# Patient Record
Sex: MALE | Race: WHITE | NOT HISPANIC OR LATINO | Employment: FULL TIME | URBAN - METROPOLITAN AREA
[De-identification: names, ages, dates, MRNs, and addresses within clinical notes are randomized per-mention and may not be internally consistent; named-entity substitution may affect disease eponyms.]

---

## 2017-03-31 RX ORDER — EXTENDED PHENYTOIN SODIUM 30 MG/1
CAPSULE ORAL
Qty: 30 CAPSULE | Refills: 0 | Status: SHIPPED | OUTPATIENT
Start: 2017-03-31 | End: 2017-05-05 | Stop reason: SDUPTHER

## 2017-03-31 RX ORDER — PHENYTOIN SODIUM 100 MG/1
CAPSULE, EXTENDED RELEASE ORAL
Qty: 150 CAPSULE | Refills: 0 | Status: SHIPPED | OUTPATIENT
Start: 2017-03-31 | End: 2017-05-05 | Stop reason: SDUPTHER

## 2017-05-04 RX ORDER — EXTENDED PHENYTOIN SODIUM 30 MG/1
CAPSULE ORAL
Qty: 30 CAPSULE | Refills: 0 | OUTPATIENT
Start: 2017-05-04

## 2017-05-04 RX ORDER — PHENYTOIN SODIUM 100 MG/1
CAPSULE, EXTENDED RELEASE ORAL
Qty: 150 CAPSULE | Refills: 0 | OUTPATIENT
Start: 2017-05-04

## 2017-05-05 RX ORDER — EXTENDED PHENYTOIN SODIUM 30 MG/1
30 CAPSULE ORAL DAILY
Qty: 30 CAPSULE | Refills: 0 | Status: SHIPPED | OUTPATIENT
Start: 2017-05-05 | End: 2017-05-15 | Stop reason: SDUPTHER

## 2017-05-05 RX ORDER — PHENYTOIN SODIUM 100 MG/1
500 CAPSULE, EXTENDED RELEASE ORAL DAILY
Qty: 150 CAPSULE | Refills: 0 | Status: SHIPPED | OUTPATIENT
Start: 2017-05-05 | End: 2017-05-15 | Stop reason: SDUPTHER

## 2017-05-15 ENCOUNTER — OFFICE VISIT (OUTPATIENT)
Dept: NEUROLOGY | Facility: CLINIC | Age: 57
End: 2017-05-15

## 2017-05-15 VITALS
WEIGHT: 232 LBS | DIASTOLIC BLOOD PRESSURE: 75 MMHG | BODY MASS INDEX: 29.77 KG/M2 | HEIGHT: 74 IN | SYSTOLIC BLOOD PRESSURE: 135 MMHG

## 2017-05-15 DIAGNOSIS — R56.9 CONVULSIONS, UNSPECIFIED CONVULSION TYPE (HCC): Primary | ICD-10-CM

## 2017-05-15 PROCEDURE — 99212 OFFICE O/P EST SF 10 MIN: CPT | Performed by: PSYCHIATRY & NEUROLOGY

## 2017-05-15 RX ORDER — EXTENDED PHENYTOIN SODIUM 30 MG/1
30 CAPSULE ORAL DAILY
Qty: 30 CAPSULE | Refills: 0 | Status: SHIPPED | OUTPATIENT
Start: 2017-05-15 | End: 2018-05-14 | Stop reason: SDUPTHER

## 2017-05-15 RX ORDER — PHENYTOIN SODIUM 100 MG/1
500 CAPSULE, EXTENDED RELEASE ORAL DAILY
Qty: 150 CAPSULE | Refills: 11 | Status: SHIPPED | OUTPATIENT
Start: 2017-05-15 | End: 2018-05-14 | Stop reason: SDUPTHER

## 2018-05-14 ENCOUNTER — OFFICE VISIT (OUTPATIENT)
Dept: NEUROLOGY | Facility: CLINIC | Age: 58
End: 2018-05-14

## 2018-05-14 VITALS
DIASTOLIC BLOOD PRESSURE: 78 MMHG | HEIGHT: 74 IN | SYSTOLIC BLOOD PRESSURE: 120 MMHG | BODY MASS INDEX: 28.88 KG/M2 | WEIGHT: 225 LBS

## 2018-05-14 DIAGNOSIS — R56.9 CONVULSIONS, UNSPECIFIED CONVULSION TYPE (HCC): Primary | ICD-10-CM

## 2018-05-14 PROCEDURE — 99212 OFFICE O/P EST SF 10 MIN: CPT | Performed by: PSYCHIATRY & NEUROLOGY

## 2018-05-14 RX ORDER — EXTENDED PHENYTOIN SODIUM 30 MG/1
30 CAPSULE ORAL DAILY
Qty: 30 CAPSULE | Refills: 11 | Status: SHIPPED | OUTPATIENT
Start: 2018-05-14 | End: 2018-09-13 | Stop reason: SDUPTHER

## 2018-05-14 RX ORDER — PHENYTOIN SODIUM 100 MG/1
500 CAPSULE, EXTENDED RELEASE ORAL DAILY
Qty: 150 CAPSULE | Refills: 11 | Status: SHIPPED | OUTPATIENT
Start: 2018-05-14 | End: 2019-05-14 | Stop reason: SDUPTHER

## 2018-05-14 NOTE — PROGRESS NOTES
Subjective:     Patient ID: Saran Rivera is a 58 y.o. male.    History of Present Illness     No seizures over the past year.  Compliant.  No side effects.  No new problems.  The following portions of the patient's history were reviewed and updated as appropriate: allergies, current medications, past family history, past medical history, past social history, past surgical history and problem list.      Current Outpatient Prescriptions:   •  allopurinol (ZYLOPRIM) 300 MG tablet, Take  by mouth daily., Disp: , Rfl:   •  colchicine 0.6 MG tablet, Take  by mouth daily., Disp: , Rfl:   •  DILANTIN 100 MG ER capsule, Take 5 capsules by mouth Daily., Disp: 150 capsule, Rfl: 11  •  DILANTIN 30 MG ER capsule, Take 1 capsule by mouth Daily., Disp: 30 capsule, Rfl: 11  •  fenofibrate (TRICOR) 145 MG tablet, Take  by mouth daily., Disp: , Rfl:     Review of Systems   Constitutional: Negative.    Neurological: Negative.    Psychiatric/Behavioral: Negative.         Objective:    Neurologic Exam  Mental status examination was appropriate.  Funduscopy, visual fields, eye movements and pupillary reflexes were normal.  No facial weakness was noted.  Gait was normal.  No pattern of focal weakness was noted.  Physical Exam    Assessment/Plan:     Saran was seen today for seizures.    Diagnoses and all orders for this visit:    Convulsions, unspecified convulsion type    Other orders  -     DILANTIN 100 MG ER capsule; Take 5 capsules by mouth Daily.  -     DILANTIN 30 MG ER capsule; Take 1 capsule by mouth Daily.         Prescription drug management - meds as above, brand name    Follow-up in the office in one year. Thank you for allowing me to share in the care of this patient.  Johan Guerra M.D.

## 2018-09-13 RX ORDER — EXTENDED PHENYTOIN SODIUM 30 MG/1
CAPSULE ORAL
Qty: 90 CAPSULE | Refills: 4 | Status: SHIPPED | OUTPATIENT
Start: 2018-09-13 | End: 2019-05-14 | Stop reason: SDUPTHER

## 2019-05-14 ENCOUNTER — OFFICE VISIT (OUTPATIENT)
Dept: NEUROLOGY | Facility: CLINIC | Age: 59
End: 2019-05-14

## 2019-05-14 VITALS
SYSTOLIC BLOOD PRESSURE: 145 MMHG | BODY MASS INDEX: 29.26 KG/M2 | DIASTOLIC BLOOD PRESSURE: 71 MMHG | WEIGHT: 228 LBS | HEIGHT: 74 IN

## 2019-05-14 DIAGNOSIS — R56.9 CONVULSIONS, UNSPECIFIED CONVULSION TYPE (HCC): Primary | ICD-10-CM

## 2019-05-14 PROCEDURE — 99212 OFFICE O/P EST SF 10 MIN: CPT | Performed by: PSYCHIATRY & NEUROLOGY

## 2019-05-14 RX ORDER — PHENYTOIN SODIUM 100 MG/1
500 CAPSULE, EXTENDED RELEASE ORAL DAILY
Qty: 150 CAPSULE | Refills: 11 | Status: SHIPPED | OUTPATIENT
Start: 2019-05-14 | End: 2020-04-20

## 2019-05-14 RX ORDER — EXTENDED PHENYTOIN SODIUM 30 MG/1
30 CAPSULE ORAL DAILY
Qty: 90 CAPSULE | Refills: 3 | Status: SHIPPED | OUTPATIENT
Start: 2019-05-14 | End: 2020-04-20

## 2019-05-14 NOTE — PROGRESS NOTES
Subjective:     Patient ID: Saran Rivera is a 59 y.o. male.    History of Present Illness  No seizures over the past year.   No side effects.  Compliant.  On Dilantin brand-name short 30 mg a morning and 500 mg at night.     The following portions of the patient's history were reviewed and updated as appropriate: allergies, current medications, past family history, past medical history, past social history, past surgical history and problem list.      Current Outpatient Medications:   •  allopurinol (ZYLOPRIM) 300 MG tablet, Take  by mouth daily., Disp: , Rfl:   •  colchicine 0.6 MG tablet, Take  by mouth daily., Disp: , Rfl:   •  DILANTIN 100 MG ER capsule, Take 5 capsules by mouth Daily., Disp: 150 capsule, Rfl: 11  •  DILANTIN 30 MG ER capsule, Take 1 capsule by mouth Daily., Disp: 90 capsule, Rfl: 3  •  fenofibrate (TRICOR) 145 MG tablet, Take  by mouth daily., Disp: , Rfl:     Review of Systems   Constitutional: Negative.    Neurological: Negative.    Psychiatric/Behavioral: Negative.           I have reviewed ROS completed by medical assistant.     Objective:    Neurologic Exam  Mental status examination was appropriate.  Funduscopy, visual fields, eye movements and pupillary reflexes were normal.  No facial weakness was noted.  Gait was normal.  No pattern of focal weakness was noted.  Physical Exam    Assessment/Plan:     Saran was seen today for seizures.    Diagnoses and all orders for this visit:    Convulsions, unspecified convulsion type (CMS/HCC)    Other orders  -     DILANTIN 30 MG ER capsule; Take 1 capsule by mouth Daily.  -     DILANTIN 100 MG ER capsule; Take 5 capsules by mouth Daily.         Prescription drug management - meds as above    Follow-up in the office in one year. Thank you for allowing me to share in the care of this patient.  Johan Guerra M.D.

## 2020-01-08 ENCOUNTER — TELEPHONE (OUTPATIENT)
Dept: NEUROLOGY | Facility: CLINIC | Age: 60
End: 2020-01-08

## 2020-01-08 NOTE — TELEPHONE ENCOUNTER
Pt stated his insurance changed to [employer] and they are not wanting to pay for brand name dilantin. He stated that Dr. Waters had wrote a letter for him before when he had this issue. He wanted to know if he could get another letter to send to [facility] to explain to them why he needed to be on brand name dilantin. He stated he has enough to last him for a little bit but wanted to go ahead and get the letter started and sent so he doesn't have any trouble refilling his medicine.

## 2020-04-20 RX ORDER — PHENYTOIN SODIUM 100 MG/1
500 CAPSULE, EXTENDED RELEASE ORAL DAILY
Qty: 450 CAPSULE | Refills: 0 | Status: SHIPPED | OUTPATIENT
Start: 2020-04-20 | End: 2020-05-07 | Stop reason: SDUPTHER

## 2020-04-20 RX ORDER — EXTENDED PHENYTOIN SODIUM 30 MG/1
CAPSULE ORAL
Qty: 90 CAPSULE | Refills: 0 | Status: SHIPPED | OUTPATIENT
Start: 2020-04-20 | End: 2020-05-07 | Stop reason: SDUPTHER

## 2020-05-07 ENCOUNTER — TELEMEDICINE (OUTPATIENT)
Dept: NEUROLOGY | Facility: CLINIC | Age: 60
End: 2020-05-07

## 2020-05-07 DIAGNOSIS — R56.9 CONVULSIONS, UNSPECIFIED CONVULSION TYPE (HCC): Primary | ICD-10-CM

## 2020-05-07 PROCEDURE — 99442 PR PHYS/QHP TELEPHONE EVALUATION 11-20 MIN: CPT | Performed by: PSYCHIATRY & NEUROLOGY

## 2020-05-07 RX ORDER — PHENYTOIN SODIUM 100 MG/1
500 CAPSULE, EXTENDED RELEASE ORAL DAILY
Qty: 450 CAPSULE | Refills: 3 | Status: SHIPPED | OUTPATIENT
Start: 2020-05-07 | End: 2021-05-06 | Stop reason: SDUPTHER

## 2020-05-07 RX ORDER — EXTENDED PHENYTOIN SODIUM 30 MG/1
30 CAPSULE ORAL DAILY
Qty: 90 CAPSULE | Refills: 3 | Status: SHIPPED | OUTPATIENT
Start: 2020-05-07 | End: 2021-05-06 | Stop reason: SDUPTHER

## 2020-05-07 NOTE — PROGRESS NOTES
I performed this clinical encounter by utilizing a real-time telehealth video connection between my location and the patient's location at home.  I obtained verbal consent from the patient to perform this clinical encounter utilizing video and prepared the patient by answering any questions they had about the telehealth interaction.    Subjective:       Patient ID: Saran Rivera is a 60 y.o. male presents for   Chief Complaint   Patient presents with   • Seizures          History of Present Illness  Phone visit.  Patient was seen back in the office for follow-up of seizures.  No seizures since his last visit a year ago.  He is on brand-name Dilantin 530 mg at night.  He has had breakthrough seizures on generic in the past.  No side effects.  Wishes to make no change.    The following portions of the patient's history were reviewed and updated as appropriate: allergies, current medications, past family history, past medical history, past social history, past surgical history and problem list.      Current Outpatient Medications on File Prior to Visit   Medication Sig Dispense Refill   • allopurinol (ZYLOPRIM) 300 MG tablet Take  by mouth daily.     • colchicine 0.6 MG tablet Take  by mouth daily.     • fenofibrate (TRICOR) 145 MG tablet Take  by mouth daily.     • [DISCONTINUED] DILANTIN 100 MG ER capsule TAKE 5 CAPSULES BY MOUTH DAILY 450 capsule 0   • [DISCONTINUED] DILANTIN 30 MG ER capsule TAKE 1 CAPSULE BY MOUTH EVERY DAY 90 capsule 0     No current facility-administered medications on file prior to visit.            Review of Systems       I have reviewed ROS completed by medical assistant.     Objective:    Neurologic Exam    Physical Exam    Assessment/Plan:  Saran was seen today for seizures.    Diagnoses and all orders for this visit:    Convulsions, unspecified convulsion type (CMS/HCC)    Other orders  -     DILANTIN 30 MG ER capsule; Take 1 capsule by mouth Daily.  -     DILANTIN 100 MG ER  capsule; Take 5 capsules by mouth Daily.      Prescription drug management - meds as above    Follow-up in the office in one year. Thank you for allowing me to share in the care of this patient.  Johan Guerra M.D.     Unable to complete visit using a video connection to the patient. A phone visit was used to complete this visits. Total time of discussion was 15 minutes.

## 2021-05-06 ENCOUNTER — OFFICE VISIT (OUTPATIENT)
Dept: NEUROLOGY | Facility: CLINIC | Age: 61
End: 2021-05-06

## 2021-05-06 VITALS
HEIGHT: 74 IN | OXYGEN SATURATION: 96 % | DIASTOLIC BLOOD PRESSURE: 76 MMHG | SYSTOLIC BLOOD PRESSURE: 124 MMHG | HEART RATE: 74 BPM | WEIGHT: 235.6 LBS | BODY MASS INDEX: 30.24 KG/M2

## 2021-05-06 DIAGNOSIS — G40.909 SEIZURE DISORDER (HCC): Primary | ICD-10-CM

## 2021-05-06 PROCEDURE — 99213 OFFICE O/P EST LOW 20 MIN: CPT | Performed by: NURSE PRACTITIONER

## 2021-05-06 RX ORDER — ROSUVASTATIN CALCIUM 10 MG/1
10 TABLET, COATED ORAL
COMMUNITY
Start: 2021-02-13

## 2021-05-06 RX ORDER — PHENYTOIN SODIUM 100 MG/1
500 CAPSULE, EXTENDED RELEASE ORAL DAILY
Qty: 450 CAPSULE | Refills: 3 | Status: SHIPPED | OUTPATIENT
Start: 2021-05-06 | End: 2022-04-21

## 2021-05-06 RX ORDER — EXTENDED PHENYTOIN SODIUM 30 MG/1
30 CAPSULE ORAL DAILY
Qty: 90 CAPSULE | Refills: 3 | Status: SHIPPED | OUTPATIENT
Start: 2021-05-06 | End: 2022-05-05 | Stop reason: SDUPTHER

## 2021-05-06 NOTE — PROGRESS NOTES
Subjective:     Patient ID: Saran Rivera is a 61 y.o. male presenting for follow-up for seizure disorder.  He is a previous patient of Dr. Guerra.  He was last seen 1 year ago.  He states that he began having seizures in the early 90s.  He was in his 40s.  He states he had several seizures within a span of a few months and was started on Dilantin.  After several months they achieve seizure control.  His dose is 300 mg in the morning and 230 mg at nighttime.  He describes his seizures as generalized seizures.  He says he has a family history of seizure in his brother, however this was a febrile seizure as a child.  No other family history of seizures.  He denies a personal history of febrile seizures.  He denies a history of head injury.  He says he had several EEGs and MRIs around the onset of the seizures and he states he was told his EEG was abnormal but no known cause for the seizures was found.  He denies any problems since his last visit.          History of Present Illness  The following portions of the patient's history were reviewed and updated as appropriate: allergies, current medications, past family history, past medical history, past social history, past surgical history and problem list.    Review of Systems   Constitutional: Negative for chills, fatigue and fever.   HENT: Negative for hearing loss, tinnitus and trouble swallowing.    Eyes: Negative for pain, redness and itching.   Respiratory: Negative for cough, shortness of breath and wheezing.    Cardiovascular: Negative for chest pain, palpitations and leg swelling.   Gastrointestinal: Negative for diarrhea, nausea and vomiting.   Endocrine: Negative for cold intolerance, heat intolerance and polydipsia.   Genitourinary: Negative for decreased urine volume, difficulty urinating and urgency.   Musculoskeletal: Negative for back pain, neck pain and neck stiffness.   Skin: Negative for color change, rash and wound.   Allergic/Immunologic:  Negative for environmental allergies, food allergies and immunocompromised state.   Neurological: Negative for dizziness, tremors, seizures, syncope, facial asymmetry, speech difficulty, weakness, light-headedness, numbness and headaches.   Hematological: Negative for adenopathy. Does not bruise/bleed easily.   Psychiatric/Behavioral: Negative for confusion and sleep disturbance. The patient is not nervous/anxious.         Objective:    Neurologic Exam  General: Well nourished, well developed, and in no acute distress.  HEENT: Normocephalic/atraumatic. Mucous membranes moist. Sclerae anicteric.   Heart: Regular rate and rhythm. No murmurs, rubs or gallops.  Lungs: Clear to auscultation bilaterally.  Skin: No notable rashes or lesions on the visible surfaces.   Extremities: No clubbing, cyanosis or significant edema.   Psychiatric: Pleasant, cooperative, and appropriate.   Neurologic Exam:  Mental Status:  Alert and oriented. Speech is fluent. Comprehension is intact.   Cranial Nerves II-XII: Pupils equal, round, reactive to light. Extraocular movements are full and conjugate in all directions. Pursuit movements do not provoke any apparent dizziness or discomfort.  No nystagmus noted.  Hearing is intact to voice. Facial strength and sensation are preserved and symmetric. Tongue and palate midline. Voice non-hoarse, non-dysarthric.   Motor: Normal bulk and tone of bilateral upper and lower extremities. Strength is 5/5 in all 4 extremities both proximally and distally. There are no abnormal or involuntary movements noted.  Sensation: Intact to light touch throughout. Romberg was negative with no significant sway.   Coordination: Fully intact. Finger-to-nose performed accurately bilaterally.  Reflexes: The deep tendon reflexes are 2+/4 in bilateral biceps, brachioradialis, triceps, patella, and Achilles.  No pathologic reflexes were noted.  Gait: Normal. No ataxia or apraxia.         Physical  Exam    Assessment/Plan:     Diagnoses and all orders for this visit:    1. Seizure disorder (CMS/HCC) (Primary)    Other orders  -     Dilantin 30 MG ER capsule; Take 1 capsule by mouth Daily. NAME BRAND  Dispense: 90 capsule; Refill: 3  -     Dilantin 100 MG ER capsule; Take 5 capsules by mouth Daily. NAME BRAND  Dispense: 450 capsule; Refill: 3          He is doing well.  No changes made today.  He will continue Dilantin, name brand only, 300 mg in the morning and 230 mg in the evening.  Seizure precautions were discussed.  He is to call with any problems, otherwise he will follow-up annually.

## 2022-04-21 RX ORDER — PHENYTOIN SODIUM 100 MG/1
500 CAPSULE, EXTENDED RELEASE ORAL DAILY
Qty: 450 CAPSULE | Refills: 3 | Status: SHIPPED | OUTPATIENT
Start: 2022-04-21 | End: 2022-05-05 | Stop reason: SDUPTHER

## 2022-05-05 ENCOUNTER — OFFICE VISIT (OUTPATIENT)
Dept: NEUROLOGY | Facility: CLINIC | Age: 62
End: 2022-05-05

## 2022-05-05 VITALS
HEART RATE: 91 BPM | WEIGHT: 235 LBS | HEIGHT: 74 IN | BODY MASS INDEX: 30.16 KG/M2 | SYSTOLIC BLOOD PRESSURE: 138 MMHG | OXYGEN SATURATION: 96 % | DIASTOLIC BLOOD PRESSURE: 80 MMHG

## 2022-05-05 DIAGNOSIS — R56.9 CONVULSIONS, UNSPECIFIED CONVULSION TYPE: Primary | ICD-10-CM

## 2022-05-05 PROCEDURE — 99213 OFFICE O/P EST LOW 20 MIN: CPT | Performed by: NURSE PRACTITIONER

## 2022-05-05 RX ORDER — PHENYTOIN SODIUM 100 MG/1
500 CAPSULE, EXTENDED RELEASE ORAL DAILY
Qty: 450 CAPSULE | Refills: 3 | Status: SHIPPED | OUTPATIENT
Start: 2022-05-05 | End: 2023-02-13 | Stop reason: SDUPTHER

## 2022-05-05 RX ORDER — EXTENDED PHENYTOIN SODIUM 30 MG/1
30 CAPSULE ORAL DAILY
Qty: 90 CAPSULE | Refills: 3 | Status: SHIPPED | OUTPATIENT
Start: 2022-05-05

## 2022-05-05 NOTE — PROGRESS NOTES
Subjective:     Patient ID: Saran Rivera is a 62 y.o. male presenting for follow-up for seizure disorder.  He is a previous patient of Dr. Guerra.  He was last seen 1 year ago.  He states that he began having seizures in the early 90s.  He was in his 40s.  He states he had several seizures within a span of a few months and was started on Dilantin.  After several months they achieve seizure control.  His dose is 300 mg in the morning and 230 mg at nighttime.  He describes his seizures as generalized seizures.  He says he has a family history of seizure in his brother, however this was a febrile seizure as a child.  No other family history of seizures.  He denies a personal history of febrile seizures.  He denies a history of head injury.  He says he had several EEGs and MRIs around the onset of the seizures and he states he was told his EEG was abnormal but no known cause for the seizures was found.  He denies any problems since his last visit.      His last seizure was about 27 years ago. He says his youngest daughter was 4 at the time and was having a birthday party. He was in his basement working on his computer and was found on the floor by his wife. No seizures since that time.           History of Present Illness  The following portions of the patient's history were reviewed and updated as appropriate: allergies, current medications, past family history, past medical history, past social history, past surgical history and problem list.    Review of Systems   Musculoskeletal: Negative for gait problem.   Neurological: Negative for dizziness, tremors, seizures, syncope, facial asymmetry, speech difficulty, weakness, light-headedness, numbness and headaches.   Psychiatric/Behavioral: Negative for agitation, behavioral problems, confusion, decreased concentration, dysphoric mood, hallucinations, self-injury, sleep disturbance and suicidal ideas. The patient is not nervous/anxious and is not hyperactive.          Objective:    Neurologic Exam  General: Well nourished, well developed, and in no acute distress.  HEENT: Normocephalic/atraumatic. Mucous membranes moist. Sclerae anicteric.   Heart: Regular rate and rhythm. No murmurs, rubs or gallops.  Lungs: Clear to auscultation bilaterally.  Skin: No notable rashes or lesions on the visible surfaces.   Extremities: No clubbing, cyanosis or significant edema.   Psychiatric: Pleasant, cooperative, and appropriate.   Neurologic Exam:  Mental Status:  Alert and oriented. Speech is fluent. Comprehension is intact.   Cranial Nerves II-XII: Pupils equal, round, reactive to light. Extraocular movements are full and conjugate in all directions. Pursuit movements do not provoke any apparent dizziness or discomfort.  No nystagmus noted.  Hearing is intact to voice. Facial strength and sensation are preserved and symmetric. Tongue and palate midline. Voice non-hoarse, non-dysarthric.   Motor: Normal bulk and tone of bilateral upper and lower extremities. Strength is 5/5 in all 4 extremities both proximally and distally. There are no abnormal or involuntary movements noted.  Sensation: Intact to light touch throughout. Romberg was negative with no significant sway.   Coordination: Fully intact. Finger-to-nose performed accurately bilaterally.  Reflexes: The deep tendon reflexes are 2+/4 in bilateral biceps, brachioradialis, triceps, patella, and Achilles.  No pathologic reflexes were noted.  Gait: Normal. No ataxia or apraxia.     Physical Exam    Assessment/Plan:     Diagnoses and all orders for this visit:    1. Convulsions, unspecified convulsion type (HCC) (Primary)    Other orders  -     Dilantin 30 MG ER capsule; Take 1 capsule by mouth Daily. NAME BRAND  Dispense: 90 capsule; Refill: 3  -     Dilantin 100 MG capsule; Take 5 capsules by mouth Daily. NAME BRAND  Dispense: 450 capsule; Refill: 3          He is doing well.  No changes made today.  He will continue Dilantin, name  brand only, 300 mg in the morning and 230 mg in the evening.  Seizure precautions were discussed.  He is to call with any problems, otherwise he will follow-up annually.

## 2023-02-13 RX ORDER — PHENYTOIN SODIUM 100 MG/1
500 CAPSULE, EXTENDED RELEASE ORAL DAILY
Qty: 450 CAPSULE | Refills: 3 | Status: SHIPPED | OUTPATIENT
Start: 2023-02-13

## 2023-02-13 NOTE — TELEPHONE ENCOUNTER
Caller: GERDA WONG     Relationship: SELF    Best call back number: [phone]    Requested Prescriptions:   Requested Prescriptions     Pending Prescriptions Disp Refills   • Dilantin 100 MG capsule 450 capsule 3     Sig: Take 5 capsules by mouth Daily. NAME BRAND        Pharmacy where request should be sent: [facility]/PHARMACY #[id number] [place], [place] - [address] AT [address] [facility] - [phone] [facility] [phone] FX     Additional details provided by patient: PATIENT SAID HIS INSURANCE NEEDS MORE INFORMATION ABOUT THE RX SO THEY WILL APPROVE AND COVER THE COSTS. PATIENT SAID HE HAS ABOUT A WEEKS WORTH OF THE DILANTIN 100MG CAPSULES LEFT.     Does the patient have less than a 3 day supply:  [] Yes  [x] No    Would you like a call back once the refill request has been completed: [] Yes [x] No    If the office needs to give you a call back, can they leave a voicemail: [] Yes [x] No    Kenia Wilkes Rep   02/13/23 14:28 EST

## 2023-05-17 RX ORDER — EXTENDED PHENYTOIN SODIUM 30 MG/1
30 CAPSULE ORAL DAILY
Qty: 90 CAPSULE | Refills: 3 | Status: SHIPPED | OUTPATIENT
Start: 2023-05-17

## 2023-06-01 ENCOUNTER — OFFICE VISIT (OUTPATIENT)
Dept: NEUROLOGY | Facility: CLINIC | Age: 63
End: 2023-06-01

## 2023-06-01 VITALS
DIASTOLIC BLOOD PRESSURE: 72 MMHG | HEART RATE: 87 BPM | HEIGHT: 74 IN | BODY MASS INDEX: 30.17 KG/M2 | SYSTOLIC BLOOD PRESSURE: 138 MMHG | OXYGEN SATURATION: 97 %

## 2023-06-01 DIAGNOSIS — Z87.898 HISTORY OF SEIZURE: Primary | ICD-10-CM

## 2023-06-01 RX ORDER — PHENYTOIN SODIUM 100 MG/1
500 CAPSULE, EXTENDED RELEASE ORAL DAILY
Qty: 450 CAPSULE | Refills: 3 | Status: SHIPPED | OUTPATIENT
Start: 2023-06-01

## 2023-06-01 RX ORDER — EXTENDED PHENYTOIN SODIUM 30 MG/1
30 CAPSULE ORAL DAILY
Qty: 90 CAPSULE | Refills: 3 | Status: SHIPPED | OUTPATIENT
Start: 2023-06-01

## 2023-06-01 NOTE — LETTER
"June 1, 2023       No Recipients    Patient: Saran Rivera   YOB: 1960   Date of Visit: 6/1/2023       Dear Dr. Rockwell Recipients:    Thank you for referring Saran Rivera to me for evaluation. Below are the relevant portions of my assessment and plan of care.    If you have questions, please do not hesitate to call me. I look forward to following Saran along with you.         Sincerely,        RENATE Marinelli        CC:   No Recipients    Mohan Steele APRN  06/01/23 1619  Signed  Subjective    Saran Rivera is a 63 y.o. male presenting for follow-up.  He has a history of generalized convulsive seizures.  He is a previous patient of Dr. Guerra and has been taking Dilantin for many years.  He states that he began having seizures in the early 90s, he was in his 40s.  He had several seizures within a span of a few months and was started on Dilantin.  His current dose is 300 mg in the morning and 230 mg at nighttime.  He has not been on any other medications for his seizures.  He does note a family history of seizure in his brother, however he states this was a febrile seizure as a child.  No cause for his seizures was identified.  There are no previous EEGs in his chart, however he states that he did have an EEG many years ago and was told that it was \"abnormal\".  He states he had an MRI as well of his brain, however this is not in his chart either.  He states this was normal.    At his last visit he stated that his last seizure was almost 30 years ago, when his youngest daughter was about 4 years old.  Today he corrected this and stated that she was 14 years old.  He was in his basement working on his computer and was found in the floor by his wife.  He has not had any seizures since that time.  He is compliant with his medication.    Patient does note today that he is concerned about his memory.  He wonders if this could be related to the Dilantin.  He says that he has a hard " time remembering recent information.  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    History of Present Illness     Review of Systems   Allergic/Immunologic: Negative for environmental allergies, food allergies and immunocompromised state.   Neurological: Negative for dizziness, tremors, seizures, syncope, facial asymmetry, speech difficulty, weakness, light-headedness, numbness, headache, memory problem and confusion.   Hematological: Negative for adenopathy. Does not bruise/bleed easily.   Psychiatric/Behavioral: Negative for sleep disturbance and stress. The patient is not nervous/anxious.        I have reviewed and confirmed the accuracy of the patient's history: Chief complaint, HPI, ROS, Subjective and Past Family Social History as entered by the MA/LPN/RN. Christine Keith MA 06/01/23      Objective      Physical Examination:  General Appearance:  Well developed, well nourished, well groomed, alert, and cooperative.  HEENT: Normocephalic.    Neck and Spine: Normal range of motion.  Normal alignment. No mass or tenderness. No bruits.  Cardiac: Regular rate and rhythm. No murmurs.  Peripheral Vasculature: Radial and pedal pulses are equal and symmetric.   Extremities: No edema or deformities. Normal joint ROM.  Skin: No rashes or birth marks.      Neurological examination:   Higher Integrative Function: Oriented to time, place, and person. Normal registration, recall attention span and concentration. Normal language including comprehension, spontaneous speech, repetition, reading, writing, naming and vocabulary. No neglect with normal visual-spatial function and construction. Normal fund of knowledge and higher integrative function.   CN II: Pupils are equal, round and reactive to light. Normal visual acuity and visual fields.   CN III IV VI: Extraocular movements are full without nystagmus.   CN V: Normal facial sensation  and strength of muscles of mastication.   CN VII: Facial movements are symmetric. No weakness.   CN VIII: Auditory acuity is normal.  CN IX & X: Symmetric palatal movement.   CN XI: Sternocleidomastoid and trapezius are normal. No weakness.   CN XII: The tongue is midline. No atrophy or fasciculations.  Motor: Normal muscle strength, bulk and tone in upper and lower extremities. No fasciculations, rigidity, spasticity, or abnormal movements.   Reflexes: 2+ in the upper and lower extremities. Plantar responses are flexor.   Sensation: Normal light touch, pinprick, vibration, temperature, and proprioception in the arms and legs.   Station and Gait: Normal gait and station.   Coordination: Finger to nose test shows no dysmetria. Rapid alternating movements are normal. Heel to shin is normal.           Assessment & Plan   Diagnoses and all orders for this visit:    1. History of seizure (Primary)    Given his reports of memory concerns on the Dilantin we discussed tapering him off of this medication and trying a newer anticonvulsant.  He is interested in this.  I recommended that he see one of our epileptologist to discuss the best medication option for him and to give him instructions to gradually taper down the Dilantin.  We will contact him with this information.  He will call in the meantime with any problems.  For now he knows to continue the Dilantin 300 mg in the morning and 230 mg at nighttime.

## 2023-06-01 NOTE — PROGRESS NOTES
"Subjective   Saran Rivera is a 63 y.o. male presenting for follow-up.  He has a history of generalized convulsive seizures.  He is a previous patient of Dr. Guerra and has been taking Dilantin for many years.  He states that he began having seizures in the early 90s, he was in his 40s.  He had several seizures within a span of a few months and was started on Dilantin.  His current dose is 300 mg in the morning and 230 mg at nighttime.  He has not been on any other medications for his seizures.  He does note a family history of seizure in his brother, however he states this was a febrile seizure as a child.  No cause for his seizures was identified.  There are no previous EEGs in his chart, however he states that he did have an EEG many years ago and was told that it was \"abnormal\".  He states he had an MRI as well of his brain, however this is not in his chart either.  He states this was normal.    At his last visit he stated that his last seizure was almost 30 years ago, when his youngest daughter was about 4 years old.  Today he corrected this and stated that she was 14 years old.  He was in his basement working on his computer and was found in the floor by his wife.  He has not had any seizures since that time.  He is compliant with his medication.    Patient does note today that he is concerned about his memory.  He wonders if this could be related to the Dilantin.  He says that he has a hard time remembering recent information.  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    History of Present Illness     Review of Systems   Allergic/Immunologic: Negative for environmental allergies, food allergies and immunocompromised state.   Neurological: Negative for dizziness, tremors, seizures, syncope, facial asymmetry, speech difficulty, weakness, light-headedness, numbness, headache, memory problem and confusion. "   Hematological: Negative for adenopathy. Does not bruise/bleed easily.   Psychiatric/Behavioral: Negative for sleep disturbance and stress. The patient is not nervous/anxious.        I have reviewed and confirmed the accuracy of the patient's history: Chief complaint, HPI, ROS, Subjective and Past Family Social History as entered by the MA/LPN/RN. Christine Keith MA 06/01/23      Objective     Physical Examination:  General Appearance:  Well developed, well nourished, well groomed, alert, and cooperative.  HEENT: Normocephalic.    Neck and Spine: Normal range of motion.  Normal alignment. No mass or tenderness. No bruits.  Cardiac: Regular rate and rhythm. No murmurs.  Peripheral Vasculature: Radial and pedal pulses are equal and symmetric.   Extremities: No edema or deformities. Normal joint ROM.  Skin: No rashes or birth marks.      Neurological examination:   Higher Integrative Function: Oriented to time, place, and person. Normal registration, recall attention span and concentration. Normal language including comprehension, spontaneous speech, repetition, reading, writing, naming and vocabulary. No neglect with normal visual-spatial function and construction. Normal fund of knowledge and higher integrative function.   CN II: Pupils are equal, round and reactive to light. Normal visual acuity and visual fields.   CN III IV VI: Extraocular movements are full without nystagmus.   CN V: Normal facial sensation and strength of muscles of mastication.   CN VII: Facial movements are symmetric. No weakness.   CN VIII: Auditory acuity is normal.  CN IX & X: Symmetric palatal movement.   CN XI: Sternocleidomastoid and trapezius are normal. No weakness.   CN XII: The tongue is midline. No atrophy or fasciculations.  Motor: Normal muscle strength, bulk and tone in upper and lower extremities. No fasciculations, rigidity, spasticity, or abnormal movements.   Reflexes: 2+ in the upper and lower extremities. Plantar responses  are flexor.   Sensation: Normal light touch, pinprick, vibration, temperature, and proprioception in the arms and legs.   Station and Gait: Normal gait and station.   Coordination: Finger to nose test shows no dysmetria. Rapid alternating movements are normal. Heel to shin is normal.           Assessment & Plan   Diagnoses and all orders for this visit:    1. History of seizure (Primary)    Given his reports of memory concerns on the Dilantin we discussed tapering him off of this medication and trying a newer anticonvulsant.  He is interested in this.  I recommended that he see one of our epileptologist to discuss the best medication option for him and to give him instructions to gradually taper down the Dilantin.  We will contact him with this information.  He will call in the meantime with any problems.  For now he knows to continue the Dilantin 300 mg in the morning and 230 mg at nighttime.

## 2024-01-29 RX ORDER — PHENYTOIN SODIUM 100 MG/1
500 CAPSULE, EXTENDED RELEASE ORAL DAILY
Qty: 450 CAPSULE | Refills: 3 | Status: SHIPPED | OUTPATIENT
Start: 2024-01-29

## 2024-04-10 ENCOUNTER — OFFICE VISIT (OUTPATIENT)
Dept: NEUROLOGY | Facility: CLINIC | Age: 64
End: 2024-04-10
Payer: COMMERCIAL

## 2024-04-10 VITALS
SYSTOLIC BLOOD PRESSURE: 158 MMHG | RESPIRATION RATE: 20 BRPM | DIASTOLIC BLOOD PRESSURE: 96 MMHG | HEIGHT: 74 IN | BODY MASS INDEX: 32.08 KG/M2 | HEART RATE: 87 BPM | WEIGHT: 250 LBS | OXYGEN SATURATION: 95 %

## 2024-04-10 DIAGNOSIS — R29.818 SUSPECTED SLEEP APNEA: ICD-10-CM

## 2024-04-10 DIAGNOSIS — Z87.898 HISTORY OF SEIZURE: Primary | ICD-10-CM

## 2024-04-10 RX ORDER — LEVETIRACETAM 500 MG/1
500 TABLET ORAL 2 TIMES DAILY
Qty: 60 TABLET | Refills: 5 | Status: SHIPPED | OUTPATIENT
Start: 2024-04-10

## 2024-04-10 NOTE — PROGRESS NOTES
"Subjective   Saran Rivera is a 64 y.o. male presenting for follow up. He has a history of generalized convulsive seizures. These occurred about 20 years ago.  He was started on Dilantin at the time.  He had several seizures within a span of a few months.  His current dose is 300 mg in the morning and 230 mg at bedtime.  He has not been on any other medications.  He does note a family history of seizure in his brother, however he states this was a febrile seizure as a child. There are no previous EEGs in his chart, however he states that he did have an EEG many years ago and was told that it was \"abnormal\". He states he had an MRI as well of his brain, however this is not in his chart either. He states this was normal.     At his last visit he noted some memory complaints.  We discussed tapering him off of the Dilantin and starting him on a different anticonvulsant.  Today he continues to complain of some trouble with his short-term memory along with worsening fatigue.  He does note that he snores.  He has never had a sleep study.    History of Present Illness     Review of Systems    I have reviewed and confirmed the accuracy of the patient's history: Chief complaint, HPI, ROS, Subjective, and Past Family Social History as entered by the MA/ROSAN/RN. Estee Powell MA 04/10/24      Objective     Physical Examination:  General Appearance:  Well developed, well nourished, well groomed, alert, and cooperative.  HEENT: Normocephalic.    Neck and Spine: Normal range of motion.  Normal alignment. No mass or tenderness. No bruits.  Cardiac: Regular rate and rhythm. No murmurs.  Peripheral Vasculature: Radial and pedal pulses are equal and symmetric.   Extremities: No edema or deformities. Normal joint ROM.  Skin: No rashes or birth marks.      Neurological examination:   Higher Integrative Function: Oriented to time, place, and person. Normal registration, recall attention span and concentration. Normal language " including comprehension, spontaneous speech, repetition, reading, writing, naming and vocabulary. No neglect with normal visual-spatial function and construction. Normal fund of knowledge and higher integrative function.   CN II: Pupils are equal, round and reactive to light. Normal visual acuity and visual fields.   CN III IV VI: Extraocular movements are full without nystagmus.   CN V: Normal facial sensation and strength of muscles of mastication.   CN VII: Facial movements are symmetric. No weakness.   CN VIII: Auditory acuity is normal.  CN IX & X: Symmetric palatal movement.   CN XI: Sternocleidomastoid and trapezius are normal. No weakness.   CN XII: The tongue is midline. No atrophy or fasciculations.  Motor: Normal muscle strength, bulk and tone in upper and lower extremities. No fasciculations, rigidity, spasticity, or abnormal movements.   Reflexes: 2+ in the upper and lower extremities. Plantar responses are flexor.   Sensation: Normal light touch, pinprick, vibration, temperature, and proprioception in the arms and legs.   Station and Gait: Normal gait and station.   Coordination: Finger to nose test shows no dysmetria. Rapid alternating movements are normal. Heel to shin is normal.           Assessment & Plan   Diagnoses and all orders for this visit:    1. History of seizure (Primary)  -     Ambulatory Referral to Sleep Medicine    2. Suspected sleep apnea  -     Ambulatory Referral to Sleep Medicine    Other orders  -     levETIRAcetam (KEPPRA) 500 MG tablet; Take 1 tablet by mouth 2 (Two) Times a Day.  Dispense: 60 tablet; Refill: 5    We are going to taper him off of Dilantin.  I gave him written instructions to do so.  We will reduce the dose to every 2 weeks until he is no longer on the twice a day dosing.  He will call at this time to let me know how he is doing and we will begin to taper him off the morning dose.  He is going to go ahead and start Keppra 500 mg daily for the next week and then  increase to 500 mg twice a day.  He will take these 2 medications together for 1 week before he begins to taper the Dilantin.  Side effects were discussed.  Regarding his fatigue and memory complaints, I did recommend that he undergo an evaluation for possible sleep apnea.  A referral was placed to sleep medicine today for this as well.      I spent 30 minutes caring for patient on todays date of service. This time includes time spent by me in the following activities: obtaining and/or reviewing a separately obtained history, performing a medically appropriate examination and/or evaluation, counseling and educating the patient on diagnosis and treatment, ordering medications, tests, or procedures, referring and communicating with other health care professionals and documenting information in the medical record.

## 2024-05-20 ENCOUNTER — LAB (OUTPATIENT)
Dept: LAB | Facility: HOSPITAL | Age: 64
End: 2024-05-20
Payer: COMMERCIAL

## 2024-05-20 ENCOUNTER — OFFICE VISIT (OUTPATIENT)
Dept: SLEEP MEDICINE | Facility: HOSPITAL | Age: 64
End: 2024-05-20
Payer: COMMERCIAL

## 2024-05-20 VITALS — HEIGHT: 74 IN | OXYGEN SATURATION: 97 % | WEIGHT: 248 LBS | BODY MASS INDEX: 31.83 KG/M2 | HEART RATE: 80 BPM

## 2024-05-20 DIAGNOSIS — Z72.821 INADEQUATE SLEEP HYGIENE: ICD-10-CM

## 2024-05-20 DIAGNOSIS — G47.33 OSA (OBSTRUCTIVE SLEEP APNEA): ICD-10-CM

## 2024-05-20 DIAGNOSIS — Z87.898 HISTORY OF SEIZURE: ICD-10-CM

## 2024-05-20 DIAGNOSIS — R29.818 SUSPECTED SLEEP APNEA: ICD-10-CM

## 2024-05-20 DIAGNOSIS — G47.10 HYPERSOMNIA: ICD-10-CM

## 2024-05-20 DIAGNOSIS — Z87.898 HISTORY OF SEIZURE: Primary | ICD-10-CM

## 2024-05-20 DIAGNOSIS — E66.09 CLASS 1 OBESITY DUE TO EXCESS CALORIES WITH BODY MASS INDEX (BMI) OF 31.0 TO 31.9 IN ADULT, UNSPECIFIED WHETHER SERIOUS COMORBIDITY PRESENT: ICD-10-CM

## 2024-05-20 PROBLEM — E66.811 CLASS 1 OBESITY DUE TO EXCESS CALORIES WITH BODY MASS INDEX (BMI) OF 31.0 TO 31.9 IN ADULT: Status: ACTIVE | Noted: 2024-05-20

## 2024-05-20 PROCEDURE — G0463 HOSPITAL OUTPT CLINIC VISIT: HCPCS

## 2024-05-20 PROCEDURE — 80186 ASSAY OF PHENYTOIN FREE: CPT

## 2024-05-20 PROCEDURE — 36415 COLL VENOUS BLD VENIPUNCTURE: CPT

## 2024-05-20 PROCEDURE — 99204 OFFICE O/P NEW MOD 45 MIN: CPT | Performed by: PSYCHIATRY & NEUROLOGY

## 2024-05-20 PROCEDURE — 80185 ASSAY OF PHENYTOIN TOTAL: CPT

## 2024-05-20 NOTE — PROGRESS NOTES
Reason for Consultation: Hypersomnia        Patient Care Team:  Nick Collins MD as PCP - General  Gold Carvalho MD, MPH as Consulting Physician (Sleep Medicine)      History of present illness:    Thank you for asking me to see your patient.  The patient is a 64 y.o. male has been sleepy and fall asleep in meetings for some years.  He was diagnosed with seizures probably 30 years ago and wife estimates he had between 3 and 5 seizures but they were motor seizures.  He has been on Dilantin monotherapy until lately when the plan was to initiate levetiracetam and presumably switch over.  His dose of phenytoin has 530 mg/day.  He denies diplopia or ataxia.  He estimates he is gained 10 pounds in the past 5 years.  He does not drive and is sleepy sometimes when driving.  He wakes up about 3 times to go to the restroom and he is still sleepy even if he sleeps longer.  His sleep opportunity however is compromised because he goes to bed at 11 PM gets up at 5:30 AM and is only 6 and half hours sleep opportunity.  He says he falls asleep very quickly and feels rested at least when he gets up but then as the day goes on he is very sleepy.  He does not consume alcohol and has 1 caffeinated beverage per day.  Has no prior sleep studies.    [facility]: 7    Data Reviewed: Reviewed his medical chart and sleep questionnaire.      PMH:  Past Medical History:   Diagnosis Date    Headache, tension-type     Hyperlipidemia Under Dr. Barboza    Memory loss After taking Dilantin    ANNI (obstructive sleep apnea) 5/20/2024    Seizures           Allergies:  Patient has no known allergies.     Medication Review:   Current Outpatient Medications on File Prior to Visit   Medication Sig Dispense Refill    allopurinol (ZYLOPRIM) 300 MG tablet Take  by mouth daily.      colchicine 0.6 MG tablet Take  by mouth daily.      Dilantin 100 MG capsule TAKE 5 CAPSULES BY MOUTH DAILY. NAME BRAND 450 capsule 3    Dilantin 30 MG ER capsule Take 1  "capsule by mouth Daily. NAME BRAND 90 capsule 3    fenofibrate (TRICOR) 145 MG tablet Take  by mouth daily.      levETIRAcetam (KEPPRA) 500 MG tablet Take 1 tablet by mouth 2 (Two) Times a Day. 60 tablet 5    rosuvastatin (CRESTOR) 10 MG tablet Take 1 tablet by mouth every night at bedtime.       No current facility-administered medications on file prior to visit.         Vital Signs:    Vitals:    05/20/24 1501   Pulse: 80   SpO2: 97%   Weight: 112 kg (248 lb)   Height: 188 cm (74.02\")        Body mass index is 31.82 kg/m².  Neck Circumference: 17.5 inches      Physical Exam:    Constitutional:  Well developed 64 y.o. male that appears in no apparent distress.  Awake & oriented times 3.  Normal mood with normal recent and remote memory and normal judgement.  Eyes:  Conjunctivae normal.  Oropharynx: Moist mucous membranes without exudate and Mallampati 4  Neck: Trachea midline  Respiratory: Effort is not labored  Cardiovascular: Radial pulse regular  Musculoskeletal: Gait appears normal, no digital clubbing evident, no pre-tibial edema    Has nonsustained lateral gaze evoked nystagmus in both directions.  He does not have any ataxia.    Impression:   Encounter Diagnoses   Name Primary?    History of seizure Yes    Suspected sleep apnea     Inadequate sleep hygiene     ANNI (obstructive sleep apnea)     Class 1 obesity due to excess calories with body mass index (BMI) of 31.0 to 31.9 in adult, unspecified whether serious comorbidity present     Hypersomnia      Patient's BMI is Body mass index is 31.82 kg/m².    I think the patient has at least 3 possible causes for hypersomnia.    1.  He has an adequate opportunity for sleep and I was very clear that he needs to sleep more    2.  I think it is likely that he has sleep apnea and so organ to do home sleep apnea test to evaluate that    3.  I am worried that he might have relatively high serum phenytoin concentration as he is on 530 mg.  We did check that today even " though the plan is to get him off of that..  I want a check total and free phenytoin concentration.    Plan:    Home sleep apnea test and free and total phenytoin concentration.    The patient should practice good sleep hygiene measures.      Weight loss might be beneficial in this patient who has a Body mass index is 31.82 kg/m².      Pathophysiology of ANNI described to the patient.  Cardiovascular complications of untreated ANNI also reviewed.      The patient was cautioned about the dangers of drowsy driving.    Johan Carvalho MD  Sleep Medicine  05/20/24  15:31 EDT

## 2024-05-23 LAB
PHENYTOIN FREE SERPL-MCNC: ABNORMAL UG/ML (ref 1–2)
PHENYTOIN SERPL-MCNC: 8.2 UG/ML (ref 10–20)

## 2024-05-30 ENCOUNTER — HOSPITAL ENCOUNTER (OUTPATIENT)
Dept: SLEEP MEDICINE | Facility: HOSPITAL | Age: 64
End: 2024-05-30
Payer: COMMERCIAL

## 2024-05-30 DIAGNOSIS — G47.33 OSA (OBSTRUCTIVE SLEEP APNEA): ICD-10-CM

## 2024-05-30 DIAGNOSIS — R29.818 SUSPECTED SLEEP APNEA: ICD-10-CM

## 2024-05-30 DIAGNOSIS — Z72.821 INADEQUATE SLEEP HYGIENE: ICD-10-CM

## 2024-05-30 PROCEDURE — 95806 SLEEP STUDY UNATT&RESP EFFT: CPT

## 2024-06-07 RX ORDER — EXTENDED PHENYTOIN SODIUM 30 MG/1
30 CAPSULE ORAL DAILY
Qty: 90 CAPSULE | Refills: 3 | Status: SHIPPED | OUTPATIENT
Start: 2024-06-07

## 2024-06-24 DIAGNOSIS — E66.09 CLASS 1 OBESITY DUE TO EXCESS CALORIES WITH BODY MASS INDEX (BMI) OF 31.0 TO 31.9 IN ADULT, UNSPECIFIED WHETHER SERIOUS COMORBIDITY PRESENT: ICD-10-CM

## 2024-06-24 DIAGNOSIS — G47.33 OSA (OBSTRUCTIVE SLEEP APNEA): Primary | ICD-10-CM

## 2024-06-25 ENCOUNTER — TELEPHONE (OUTPATIENT)
Dept: SLEEP MEDICINE | Facility: HOSPITAL | Age: 64
End: 2024-06-25
Payer: COMMERCIAL

## 2024-08-09 ENCOUNTER — TELEPHONE (OUTPATIENT)
Dept: SLEEP MEDICINE | Facility: HOSPITAL | Age: 64
End: 2024-08-09
Payer: COMMERCIAL

## 2024-08-09 NOTE — TELEPHONE ENCOUNTER
..DME reached out to sleep center , shceduling attempts for CPAP set up has been exhausted , orders suspended until pt reached out to DME

## 2024-11-14 RX ORDER — LEVETIRACETAM 500 MG/1
500 TABLET ORAL 2 TIMES DAILY
Qty: 180 TABLET | Refills: 1 | Status: SHIPPED | OUTPATIENT
Start: 2024-11-14

## 2025-02-11 ENCOUNTER — TELEPHONE (OUTPATIENT)
Dept: NEUROLOGY | Facility: CLINIC | Age: 65
End: 2025-02-11
Payer: COMMERCIAL

## 2025-02-11 NOTE — TELEPHONE ENCOUNTER
Attempted to LVM in regard to provider being out of office. Informed on VM, [facility] message, and mail reminder on the next new appointment set for patient. Patient is scheduled for April 24th at 9:00AM

## 2025-04-24 ENCOUNTER — OFFICE VISIT (OUTPATIENT)
Dept: NEUROLOGY | Facility: CLINIC | Age: 65
End: 2025-04-24
Payer: COMMERCIAL

## 2025-04-24 VITALS
SYSTOLIC BLOOD PRESSURE: 124 MMHG | BODY MASS INDEX: 29.39 KG/M2 | WEIGHT: 229 LBS | HEART RATE: 80 BPM | OXYGEN SATURATION: 97 % | DIASTOLIC BLOOD PRESSURE: 82 MMHG | HEIGHT: 74 IN

## 2025-04-24 DIAGNOSIS — Z87.898 HISTORY OF SEIZURE: Primary | ICD-10-CM

## 2025-04-24 PROCEDURE — 99214 OFFICE O/P EST MOD 30 MIN: CPT | Performed by: NURSE PRACTITIONER

## 2025-04-24 RX ORDER — LEVETIRACETAM 500 MG/1
500 TABLET ORAL 2 TIMES DAILY
Qty: 180 TABLET | Refills: 3 | Status: SHIPPED | OUTPATIENT
Start: 2025-04-24

## 2025-04-24 NOTE — PROGRESS NOTES
Subjective   Saran Rivera is a 65 y.o. male presenting for follow up. He has a history of generalized convulsive seizures. These occurred over 20 years ago. He was started on Dilantin at that time. He had several seizures within a span of a few months. We switched the Dilantin to Keppra last year. He has done very well with this. The Keppra dose is 500 mg twice a day. He has not had any seizures. He feels much better on Keppra. Says he even feels his memory is better.     Seizures   Pertinent negatives include no confusion and no speech difficulty.      Review of Systems   Neurological:  Negative for dizziness, tremors, seizures, syncope, facial asymmetry, speech difficulty, weakness, light-headedness, numbness, headache, memory problem and confusion.     I have reviewed and confirmed the accuracy of the patient's history: Chief complaint, HPI, ROS, Subjective, and Past Family Social History as entered by the MA/LPN/RN. Christine Keith MA 04/24/25      Objective     Physical Examination:  General Appearance:  Well developed, well nourished, well groomed, alert, and cooperative.  HEENT: Normocephalic.    Neck and Spine: Normal range of motion.  Normal alignment. No mass or tenderness. No bruits.  Cardiac: Regular rate and rhythm. No murmurs.  Peripheral Vasculature: Radial and pedal pulses are equal and symmetric.   Extremities: No edema or deformities. Normal joint ROM.  Skin: No rashes or birth marks.      Neurological examination:   Higher Integrative Function: Oriented to time, place, and person. Normal registration, recall attention span and concentration. Normal language including comprehension, spontaneous speech, repetition, reading, writing, naming and vocabulary. No neglect with normal visual-spatial function and construction. Normal fund of knowledge and higher integrative function.   CN II: Pupils are equal, round and reactive to light. Normal visual acuity and visual fields.   CN III IV VI: Extraocular  movements are full without nystagmus.   CN V: Normal facial sensation and strength of muscles of mastication.   CN VII: Facial movements are symmetric. No weakness.   CN VIII: Auditory acuity is normal.  CN IX & X: Symmetric palatal movement.   CN XI: Sternocleidomastoid and trapezius are normal. No weakness.   CN XII: The tongue is midline. No atrophy or fasciculations.  Motor: Normal muscle strength, bulk and tone in upper and lower extremities. No fasciculations, rigidity, spasticity, or abnormal movements.   Reflexes: 2+ in the upper and lower extremities. Plantar responses are flexor.   Sensation: Normal light touch, pinprick, vibration, temperature, and proprioception in the arms and legs.   Station and Gait: Normal gait and station.   Coordination: Finger to nose test shows no dysmetria. Rapid alternating movements are normal. Heel to shin is normal.           Assessment & Plan   Diagnoses and all orders for this visit:    1. History of seizure (Primary)    Other orders  -     levETIRAcetam (KEPPRA) 500 MG tablet; Take 1 tablet by mouth 2 (Two) Times a Day.  Dispense: 180 tablet; Refill: 3    He is doing well. Continue Keppra 500 mg twice a day. Seizure precautions reviewed.   Follow up 1 year, sooner if needed.     I spent 35 minutes caring for patient on todays date of service. This time includes time spent by me in the following activities: obtaining and/or reviewing a separately obtained history, performing a medically appropriate examination and/or evaluation, counseling and educating the patient on diagnosis and treatment, ordering medications, tests, or procedures, referring and communicating with other health care professionals and documenting information in the medical record.